# Patient Record
Sex: MALE | Race: WHITE
[De-identification: names, ages, dates, MRNs, and addresses within clinical notes are randomized per-mention and may not be internally consistent; named-entity substitution may affect disease eponyms.]

---

## 2017-05-19 ENCOUNTER — HOSPITAL ENCOUNTER (OUTPATIENT)
Dept: HOSPITAL 2 - ER | Age: 57
Setting detail: OBSERVATION
LOS: 1 days | Discharge: HOME | End: 2017-05-20
Attending: SURGERY | Admitting: SURGERY
Payer: COMMERCIAL

## 2017-05-19 VITALS — HEIGHT: 76 IN | WEIGHT: 246.92 LBS | BODY MASS INDEX: 30.07 KG/M2

## 2017-05-19 DIAGNOSIS — Z88.2: ICD-10-CM

## 2017-05-19 DIAGNOSIS — R10.31: ICD-10-CM

## 2017-05-19 DIAGNOSIS — Z88.0: ICD-10-CM

## 2017-05-19 DIAGNOSIS — R68.83: ICD-10-CM

## 2017-05-19 DIAGNOSIS — R10.33: ICD-10-CM

## 2017-05-19 DIAGNOSIS — Z79.899: ICD-10-CM

## 2017-05-19 DIAGNOSIS — Z79.891: ICD-10-CM

## 2017-05-19 DIAGNOSIS — K35.80: Primary | ICD-10-CM

## 2017-05-19 DIAGNOSIS — I10: ICD-10-CM

## 2017-05-19 LAB
ALBUMIN: 4.3 GM/DL (ref 3.4–5)
ALKALINE PHOSPHATASE: 37 U/L (ref 50–136)
ALT/SGPT: 15 U/L (ref 7.53–40.17)
AST/SGOT: 17 U/L (ref 6.66–35.34)
BASO #: 0 10_X3_UL (ref 0–0.1)
BASO %: 0.2 % (ref 0.2–1.2)
BILIRUBIN,TOTAL: 1.35 MG/DL (ref 0–1)
BLOOD UREA NITROGEN: 13 MG/DL (ref 7–18)
CALCIUM: 9 MG/DL (ref 8.7–10.7)
CARBON DIOXIDE: 26 MMOL/L (ref 21–32)
CKMB: 1.9 NG/ML (ref 0–5)
CREATININE: 1 MG/DL (ref 0.6–1.3)
EOS #: 0.1 10_X3_UL (ref 0–0.5)
EOS %: 1.2 % (ref 0.8–7)
GLUCOSE,RANDOM: 97 MG/DL (ref 70–99)
GRAN #: 4.9 10_X3_UL (ref 1.8–5.4)
GRAN %: 59.5 % (ref 34–67.9)
HEMATOCRIT: 44.3 % (ref 40–51)
HEMOGLOBIN: 15 G/DL (ref 13.7–17.5)
LYMPH #: 2.5 10_X3_UL (ref 1.3–3.6)
LYMPH %: 29.7 % (ref 21.8–53.1)
MEAN CORPUSCULAR HEMOGLOBIN: 30.1 PG (ref 27–33)
MEAN CORPUSCULAR HGB CONC: 33.9 G/DL (ref 32–36)
MEAN CORPUSCULAR VOLUME: 89 FL (ref 79–92)
MEAN PLATELET VOLUME: 10.7 FL (ref 7.5–11.5)
MONO #: 0.8 10_X3_UL (ref 0.3–0.8)
MONO %: 9.4 % (ref 5.3–12.2)
PH,URINE: 5 (ref 5–9)
PLATELET COUNT: 207 X10_3/UL (ref 163–337)
POTASSIUM: 4.3 MMOL/L (ref 3.5–5.1)
RED BLOOD COUNT: 4.98 X10_6/UL (ref 4.6–6.1)
RED CELL DISTRIBUTION WIDTH: 14.9 % (ref 11.6–14.4)
SODIUM: 139 MMOL/L (ref 136–145)
SPECIFIC GRAVITY,URINE: 1.02 (ref 1–1.03)
TOTAL PROTEIN: 7.3 GM/DL (ref 6.4–8.2)
TROP-I: < 0.3 NG/ML (ref 0–0.3)
URINE BILIRUBIN: NEGATIVE
URINE BLOOD: NEGATIVE
URINE GLUCOSE (UA): NORMAL MG/DL
URINE KETONE: NEGATIVE
URINE LEUKOCYTE ESTERASE: NEGATIVE
URINE NITRATE: NEGATIVE
URINE PROTEIN: NEGATIVE
UROBILINOGEN: NORMAL MG/DL (ref ?–1)
WHITE BLOOD COUNT: 8.3 X10_3/UL (ref 4.2–9.1)

## 2017-05-19 PROCEDURE — G0378 HOSPITAL OBSERVATION PER HR: HCPCS

## 2017-05-19 PROCEDURE — 0DTJ4ZZ RESECTION OF APPENDIX, PERCUTANEOUS ENDOSCOPIC APPROACH: ICD-10-PCS | Performed by: SURGERY

## 2017-05-20 LAB
CKMB: 1.8 NG/ML (ref 0–5)
CKMB: 1.9 NG/ML (ref 0–5)
HEMATOCRIT: 38.8 % (ref 40–51)
HEMOGLOBIN: 13.2 G/DL (ref 13.7–17.5)
MEAN CORPUSCULAR HEMOGLOBIN: 30.4 PG (ref 27–33)
MEAN CORPUSCULAR HGB CONC: 34 G/DL (ref 32–36)
MEAN CORPUSCULAR VOLUME: 89.4 FL (ref 79–92)
MEAN PLATELET VOLUME: 11.3 FL (ref 7.5–11.5)
PLATELET COUNT: 139 X10_3/UL (ref 163–337)
RED BLOOD COUNT: 4.34 X10_6/UL (ref 4.6–6.1)
RED CELL DISTRIBUTION WIDTH: 14.9 % (ref 11.6–14.4)
TROP-I: < 0.3 NG/ML (ref 0–0.3)
TROP-I: < 0.3 NG/ML (ref 0–0.3)
WHITE BLOOD COUNT: 11.8 X10_3/UL (ref 4.2–9.1)

## 2017-05-21 ENCOUNTER — HOSPITAL ENCOUNTER (EMERGENCY)
Dept: HOSPITAL 2 - ER | Age: 57
Discharge: HOME | End: 2017-05-21
Payer: COMMERCIAL

## 2017-05-21 DIAGNOSIS — R63.5: ICD-10-CM

## 2017-05-21 DIAGNOSIS — R14.3: ICD-10-CM

## 2017-05-21 DIAGNOSIS — Z98.890: ICD-10-CM

## 2017-05-21 DIAGNOSIS — Z88.2: ICD-10-CM

## 2017-05-21 DIAGNOSIS — Z88.0: ICD-10-CM

## 2017-05-21 DIAGNOSIS — R06.01: Primary | ICD-10-CM

## 2017-05-21 LAB
ALBUMIN: 3.8 GM/DL (ref 3.4–5)
ALKALINE PHOSPHATASE: 31 U/L (ref 50–136)
ALT/SGPT: 16 U/L (ref 7.53–40.17)
AST/SGOT: 21 U/L (ref 6.66–35.34)
BILIRUBIN,TOTAL: 0.58 MG/DL (ref 0–1)
BLOOD UREA NITROGEN: 21 MG/DL (ref 7–18)
CALCIUM: 8.2 MG/DL (ref 8.7–10.7)
CARBON DIOXIDE: 23 MMOL/L (ref 21–32)
CREATININE: 1.2 MG/DL (ref 0.6–1.3)
GLUCOSE,RANDOM: 101 MG/DL (ref 70–99)
HEMATOCRIT: 40.1 % (ref 40–51)
HEMOGLOBIN: 13.6 G/DL (ref 13.7–17.5)
MEAN CORPUSCULAR HEMOGLOBIN: 30.4 PG (ref 27–33)
MEAN CORPUSCULAR HGB CONC: 33.9 G/DL (ref 32–36)
MEAN CORPUSCULAR VOLUME: 89.5 FL (ref 79–92)
MEAN PLATELET VOLUME: 11.3 FL (ref 7.5–11.5)
PLATELET COUNT: 185 X10_3/UL (ref 163–337)
POTASSIUM: 4.3 MMOL/L (ref 3.5–5.1)
RED BLOOD COUNT: 4.48 X10_6/UL (ref 4.6–6.1)
RED CELL DISTRIBUTION WIDTH: 15.1 % (ref 11.6–14.4)
SODIUM: 138 MMOL/L (ref 136–145)
TOTAL PROTEIN: 6.5 GM/DL (ref 6.4–8.2)
WHITE BLOOD COUNT: 11.6 X10_3/UL (ref 4.2–9.1)

## 2017-05-27 ENCOUNTER — HOSPITAL ENCOUNTER (EMERGENCY)
Dept: HOSPITAL 2 - ER | Age: 57
Discharge: OTHER INSTITUTION | End: 2017-05-27
Payer: COMMERCIAL

## 2017-05-27 ENCOUNTER — HOSPITAL ENCOUNTER (INPATIENT)
Dept: HOSPITAL 2 - ER | Age: 57
LOS: 5 days | Discharge: HOME | DRG: 392 | End: 2017-06-01
Attending: FAMILY MEDICINE | Admitting: FAMILY MEDICINE
Payer: COMMERCIAL

## 2017-05-27 VITALS — WEIGHT: 249.12 LBS | HEIGHT: 76 IN | BODY MASS INDEX: 30.34 KG/M2

## 2017-05-27 DIAGNOSIS — Z79.82: ICD-10-CM

## 2017-05-27 DIAGNOSIS — G47.33: ICD-10-CM

## 2017-05-27 DIAGNOSIS — Z79.891: ICD-10-CM

## 2017-05-27 DIAGNOSIS — R10.30: ICD-10-CM

## 2017-05-27 DIAGNOSIS — Z83.3: ICD-10-CM

## 2017-05-27 DIAGNOSIS — K57.32: Primary | ICD-10-CM

## 2017-05-27 DIAGNOSIS — J06.9: ICD-10-CM

## 2017-05-27 DIAGNOSIS — Z88.0: ICD-10-CM

## 2017-05-27 DIAGNOSIS — Z88.2: ICD-10-CM

## 2017-05-27 DIAGNOSIS — F43.10: ICD-10-CM

## 2017-05-27 DIAGNOSIS — Z79.899: ICD-10-CM

## 2017-05-27 DIAGNOSIS — R11.0: ICD-10-CM

## 2017-05-27 DIAGNOSIS — M54.9: ICD-10-CM

## 2017-05-27 DIAGNOSIS — I10: ICD-10-CM

## 2017-05-27 DIAGNOSIS — K59.00: ICD-10-CM

## 2017-05-27 DIAGNOSIS — G89.29: ICD-10-CM

## 2017-05-27 DIAGNOSIS — E66.9: ICD-10-CM

## 2017-05-27 DIAGNOSIS — H40.9: ICD-10-CM

## 2017-05-27 DIAGNOSIS — D72.829: ICD-10-CM

## 2017-05-27 DIAGNOSIS — J40: ICD-10-CM

## 2017-05-27 DIAGNOSIS — F41.9: ICD-10-CM

## 2017-05-27 LAB
ALBUMIN: 4 GM/DL (ref 3.4–5)
ALKALINE PHOSPHATASE: 35 U/L (ref 50–136)
ALT/SGPT: 23 U/L (ref 7.53–40.17)
AST/SGOT: 22 U/L (ref 6.66–35.34)
BASO #: 0 10_X3_UL (ref 0–0.1)
BASO %: 0.1 % (ref 0.2–1.2)
BILIRUBIN,TOTAL: 1.34 MG/DL (ref 0–1)
BLOOD UREA NITROGEN: 15 MG/DL (ref 7–18)
CALCIUM: 9.2 MG/DL (ref 8.7–10.7)
CARBON DIOXIDE: 23 MMOL/L (ref 21–32)
CREATININE: 0.9 MG/DL (ref 0.6–1.3)
EOS #: 0.2 10_X3_UL (ref 0–0.5)
EOS %: 1.6 % (ref 0.8–7)
GLUCOSE,RANDOM: 97 MG/DL (ref 70–99)
GRAN #: 10.2 10_X3_UL (ref 1.8–5.4)
GRAN %: 70.5 % (ref 34–67.9)
HEMATOCRIT: 45.4 % (ref 40–51)
HEMOGLOBIN: 15.5 G/DL (ref 13.7–17.5)
LYMPH #: 2.7 10_X3_UL (ref 1.3–3.6)
LYMPH %: 18.3 % (ref 21.8–53.1)
MAGNESIUM: 2 MG/DL (ref 1.8–2.4)
MEAN CORPUSCULAR HEMOGLOBIN: 30.4 PG (ref 27–33)
MEAN CORPUSCULAR HGB CONC: 34.1 G/DL (ref 32–36)
MEAN CORPUSCULAR VOLUME: 89 FL (ref 79–92)
MEAN PLATELET VOLUME: 10.8 FL (ref 7.5–11.5)
MONO #: 1.4 10_X3_UL (ref 0.3–0.8)
MONO %: 9.5 % (ref 5.3–12.2)
PH,URINE: 6.5 (ref 5–9)
PLATELET COUNT: 250 X10_3/UL (ref 163–337)
POTASSIUM: 4.2 MMOL/L (ref 3.5–5.1)
RED BLOOD COUNT: 5.1 X10_6/UL (ref 4.6–6.1)
RED CELL DISTRIBUTION WIDTH: 14.7 % (ref 11.6–14.4)
SODIUM: 136 MMOL/L (ref 136–145)
SPECIFIC GRAVITY,URINE: 1.01 (ref 1–1.03)
TOTAL PROTEIN: 7.1 GM/DL (ref 6.4–8.2)
URINE BILIRUBIN: NEGATIVE
URINE BLOOD: NEGATIVE
URINE GLUCOSE (UA): NORMAL MG/DL
URINE KETONE: NEGATIVE
URINE LEUKOCYTE ESTERASE: (no result)
URINE NITRATE: NEGATIVE
URINE PROTEIN: NEGATIVE
URINE WBC: (no result) /[HPF] (ref 0–3)
UROBILINOGEN: NORMAL MG/DL (ref ?–1)
WHITE BLOOD COUNT: 14.5 X10_3/UL (ref 4.2–9.1)

## 2017-05-28 LAB
BLOOD UREA NITROGEN: 15 MG/DL (ref 7–18)
CALCIUM: 9.1 MG/DL (ref 8.7–10.7)
CARBON DIOXIDE: 23 MMOL/L (ref 21–32)
CREATININE: 0.9 MG/DL (ref 0.6–1.3)
GLUCOSE,RANDOM: 103 MG/DL (ref 70–99)
HEMATOCRIT: 45 % (ref 40–51)
HEMOGLOBIN: 15.3 G/DL (ref 13.7–17.5)
MEAN CORPUSCULAR HEMOGLOBIN: 30.1 PG (ref 27–33)
MEAN CORPUSCULAR HGB CONC: 34 G/DL (ref 32–36)
MEAN CORPUSCULAR VOLUME: 88.4 FL (ref 79–92)
MEAN PLATELET VOLUME: 10.7 FL (ref 7.5–11.5)
PLATELET COUNT: 244 X10_3/UL (ref 163–337)
POTASSIUM: 4.1 MMOL/L (ref 3.5–5.1)
RED BLOOD COUNT: 5.09 X10_6/UL (ref 4.6–6.1)
RED CELL DISTRIBUTION WIDTH: 14.7 % (ref 11.6–14.4)
SODIUM: 136 MMOL/L (ref 136–145)
WHITE BLOOD COUNT: 11.9 X10_3/UL (ref 4.2–9.1)

## 2017-05-29 LAB
ALBUMIN: 4 GM/DL (ref 3.4–5)
ALKALINE PHOSPHATASE: 37 U/L (ref 50–136)
ALT/SGPT: 17 U/L (ref 7.53–40.17)
AST/SGOT: 15 U/L (ref 6.66–35.34)
BILIRUBIN,TOTAL: 2.1 MG/DL (ref 0–1)
BLOOD UREA NITROGEN: 15 MG/DL (ref 7–18)
CALCIUM: 9.1 MG/DL (ref 8.7–10.7)
CARBON DIOXIDE: 22 MMOL/L (ref 21–32)
CREATININE: 0.9 MG/DL (ref 0.6–1.3)
GLUCOSE,RANDOM: 98 MG/DL (ref 70–99)
HEMATOCRIT: 43.3 % (ref 40–51)
HEMOGLOBIN: 14.8 G/DL (ref 13.7–17.5)
MEAN CORPUSCULAR HEMOGLOBIN: 30.5 PG (ref 27–33)
MEAN CORPUSCULAR HGB CONC: 34.2 G/DL (ref 32–36)
MEAN CORPUSCULAR VOLUME: 89.1 FL (ref 79–92)
MEAN PLATELET VOLUME: 11.1 FL (ref 7.5–11.5)
PLATELET COUNT: 220 X10_3/UL (ref 163–337)
POTASSIUM: 4.2 MMOL/L (ref 3.5–5.1)
RED BLOOD COUNT: 4.86 X10_6/UL (ref 4.6–6.1)
RED CELL DISTRIBUTION WIDTH: 14.5 % (ref 11.6–14.4)
SODIUM: 137 MMOL/L (ref 136–145)
TOTAL PROTEIN: 6.9 GM/DL (ref 6.4–8.2)
WHITE BLOOD COUNT: 9.4 X10_3/UL (ref 4.2–9.1)

## 2017-05-30 LAB
ALBUMIN: 4.1 GM/DL (ref 3.4–5)
ALKALINE PHOSPHATASE: 33 U/L (ref 50–136)
ALT/SGPT: 16 U/L (ref 7.53–40.17)
AST/SGOT: 18 U/L (ref 6.66–35.34)
BASO #: 0 10_X3_UL (ref 0–0.1)
BASO %: 0.1 % (ref 0.2–1.2)
BILIRUBIN,TOTAL: 1.76 MG/DL (ref 0–1)
BLOOD UREA NITROGEN: 13 MG/DL (ref 7–18)
CALCIUM: 9.6 MG/DL (ref 8.7–10.7)
CARBON DIOXIDE: 22 MMOL/L (ref 21–32)
CREATININE: 1 MG/DL (ref 0.6–1.3)
EOS #: 0.3 10_X3_UL (ref 0–0.5)
EOS %: 1.9 % (ref 0.8–7)
GLUCOSE,RANDOM: 105 MG/DL (ref 70–99)
GRAN #: 9.9 10_X3_UL (ref 1.8–5.4)
GRAN %: 72.7 % (ref 34–67.9)
HEMATOCRIT: 43.5 % (ref 40–51)
HEMOGLOBIN: 15 G/DL (ref 13.7–17.5)
LYMPH #: 2 10_X3_UL (ref 1.3–3.6)
LYMPH %: 14.7 % (ref 21.8–53.1)
MEAN CORPUSCULAR HEMOGLOBIN: 30.4 PG (ref 27–33)
MEAN CORPUSCULAR HGB CONC: 34.5 G/DL (ref 32–36)
MEAN CORPUSCULAR VOLUME: 88.2 FL (ref 79–92)
MEAN PLATELET VOLUME: 10.7 FL (ref 7.5–11.5)
MONO #: 1.4 10_X3_UL (ref 0.3–0.8)
MONO %: 10.6 % (ref 5.3–12.2)
PLATELET COUNT: 231 X10_3/UL (ref 163–337)
POTASSIUM: 4.2 MMOL/L (ref 3.5–5.1)
RED BLOOD COUNT: 4.93 X10_6/UL (ref 4.6–6.1)
RED CELL DISTRIBUTION WIDTH: 14.2 % (ref 11.6–14.4)
SODIUM: 136 MMOL/L (ref 136–145)
TOTAL PROTEIN: 7.4 GM/DL (ref 6.4–8.2)
WHITE BLOOD COUNT: 13.6 X10_3/UL (ref 4.2–9.1)

## 2017-05-31 LAB
BLOOD UREA NITROGEN: 16 MG/DL (ref 7–18)
CALCIUM: 9.3 MG/DL (ref 8.7–10.7)
CARBON DIOXIDE: 23 MMOL/L (ref 21–32)
CREATININE: 1.2 MG/DL (ref 0.6–1.3)
GLUCOSE,RANDOM: 114 MG/DL (ref 70–99)
HEMATOCRIT: 43.6 % (ref 40–51)
HEMOGLOBIN: 15 G/DL (ref 13.7–17.5)
MEAN CORPUSCULAR HEMOGLOBIN: 30.3 PG (ref 27–33)
MEAN CORPUSCULAR HGB CONC: 34.4 G/DL (ref 32–36)
MEAN CORPUSCULAR VOLUME: 88.1 FL (ref 79–92)
MEAN PLATELET VOLUME: 10.7 FL (ref 7.5–11.5)
PLATELET COUNT: 236 X10_3/UL (ref 163–337)
POTASSIUM: 4.3 MMOL/L (ref 3.5–5.1)
RED BLOOD COUNT: 4.95 X10_6/UL (ref 4.6–6.1)
RED CELL DISTRIBUTION WIDTH: 14.2 % (ref 11.6–14.4)
SODIUM: 135 MMOL/L (ref 136–145)
WHITE BLOOD COUNT: 13.3 X10_3/UL (ref 4.2–9.1)

## 2017-06-01 LAB
BLOOD UREA NITROGEN: 16 MG/DL (ref 7–18)
CALCIUM: 9.3 MG/DL (ref 8.7–10.7)
CARBON DIOXIDE: 22 MMOL/L (ref 21–32)
CREATININE: 1.1 MG/DL (ref 0.6–1.3)
GLUCOSE,RANDOM: 106 MG/DL (ref 70–99)
HEMATOCRIT: 42.8 % (ref 40–51)
HEMOGLOBIN: 14.8 G/DL (ref 13.7–17.5)
MEAN CORPUSCULAR HEMOGLOBIN: 30.4 PG (ref 27–33)
MEAN CORPUSCULAR HGB CONC: 34.6 G/DL (ref 32–36)
MEAN CORPUSCULAR VOLUME: 87.9 FL (ref 79–92)
MEAN PLATELET VOLUME: 10.9 FL (ref 7.5–11.5)
PLATELET COUNT: 238 X10_3/UL (ref 163–337)
POTASSIUM: 4.1 MMOL/L (ref 3.5–5.1)
RED BLOOD COUNT: 4.87 X10_6/UL (ref 4.6–6.1)
RED CELL DISTRIBUTION WIDTH: 14.2 % (ref 11.6–14.4)
SODIUM: 133 MMOL/L (ref 136–145)
WHITE BLOOD COUNT: 9.8 X10_3/UL (ref 4.2–9.1)

## 2019-03-21 ENCOUNTER — HOSPITAL ENCOUNTER (EMERGENCY)
Facility: HOSPITAL | Age: 59
Discharge: HOME | End: 2019-03-21
Attending: EMERGENCY MEDICINE | Admitting: EMERGENCY MEDICINE

## 2019-03-21 ENCOUNTER — APPOINTMENT (OUTPATIENT)
Dept: GENERAL RADIOLOGY | Facility: HOSPITAL | Age: 59
End: 2019-03-21

## 2019-03-21 ENCOUNTER — APPOINTMENT (OUTPATIENT)
Dept: CT IMAGING | Facility: HOSPITAL | Age: 59
End: 2019-03-21

## 2019-03-21 ENCOUNTER — APPOINTMENT (OUTPATIENT)
Dept: CARDIOLOGY | Facility: HOSPITAL | Age: 59
End: 2019-03-21

## 2019-03-21 VITALS
BODY MASS INDEX: 32.33 KG/M2 | HEART RATE: 79 BPM | HEIGHT: 75 IN | OXYGEN SATURATION: 93 % | WEIGHT: 260 LBS | SYSTOLIC BLOOD PRESSURE: 123 MMHG | DIASTOLIC BLOOD PRESSURE: 70 MMHG | TEMPERATURE: 100.2 F | RESPIRATION RATE: 18 BRPM

## 2019-03-21 DIAGNOSIS — Z86.59 HISTORY OF POSTTRAUMATIC STRESS DISORDER (PTSD): ICD-10-CM

## 2019-03-21 DIAGNOSIS — Z86.59 HISTORY OF DEPRESSION: ICD-10-CM

## 2019-03-21 DIAGNOSIS — M54.2 ACUTE NECK PAIN: ICD-10-CM

## 2019-03-21 DIAGNOSIS — B34.9 ACUTE VIRAL SYNDROME: Primary | ICD-10-CM

## 2019-03-21 DIAGNOSIS — Z86.79 HISTORY OF HYPERTENSION: ICD-10-CM

## 2019-03-21 DIAGNOSIS — R50.9 FEVER AND CHILLS: ICD-10-CM

## 2019-03-21 LAB
ALBUMIN SERPL-MCNC: 4.79 G/DL (ref 3.2–4.8)
ALBUMIN/GLOB SERPL: 1.8 G/DL (ref 1.5–2.5)
ALP SERPL-CCNC: 31 U/L (ref 25–100)
ALT SERPL W P-5'-P-CCNC: 26 U/L (ref 7–40)
ANION GAP SERPL CALCULATED.3IONS-SCNC: 11 MMOL/L (ref 3–11)
APPEARANCE CSF: CLEAR
AST SERPL-CCNC: 26 U/L (ref 0–33)
BASOPHILS # BLD AUTO: 0.01 10*3/MM3 (ref 0–0.2)
BASOPHILS NFR BLD AUTO: 0.1 % (ref 0–1)
BILIRUB SERPL-MCNC: 1.7 MG/DL (ref 0.3–1.2)
BILIRUB UR QL STRIP: NEGATIVE
BUN BLD-MCNC: 22 MG/DL (ref 9–23)
BUN/CREAT SERPL: 19.5 (ref 7–25)
CALCIUM SPEC-SCNC: 9.3 MG/DL (ref 8.7–10.4)
CHLORIDE SERPL-SCNC: 105 MMOL/L (ref 99–109)
CLARITY UR: CLEAR
CO2 SERPL-SCNC: 22 MMOL/L (ref 20–31)
COLOR CSF: COLORLESS
COLOR SPUN CSF: COLORLESS
COLOR UR: YELLOW
CREAT BLD-MCNC: 1.13 MG/DL (ref 0.6–1.3)
D-LACTATE SERPL-SCNC: 2 MMOL/L (ref 0.5–2)
DEPRECATED RDW RBC AUTO: 45.8 FL (ref 37–54)
EOSINOPHIL # BLD AUTO: 0.02 10*3/MM3 (ref 0–0.3)
EOSINOPHIL NFR BLD AUTO: 0.2 % (ref 0–3)
ERYTHROCYTE [DISTWIDTH] IN BLOOD BY AUTOMATED COUNT: 13.8 % (ref 11.3–14.5)
FLUAV SUBTYP SPEC NAA+PROBE: NOT DETECTED
FLUBV RNA ISLT QL NAA+PROBE: NOT DETECTED
GFR SERPL CREATININE-BSD FRML MDRD: 67 ML/MIN/1.73
GLOBULIN UR ELPH-MCNC: 2.6 GM/DL
GLUCOSE BLD-MCNC: 112 MG/DL (ref 70–100)
GLUCOSE CSF-MCNC: 70 MG/DL (ref 40–70)
GLUCOSE UR STRIP-MCNC: NEGATIVE MG/DL
HCT VFR BLD AUTO: 48 % (ref 38.9–50.9)
HGB BLD-MCNC: 15.9 G/DL (ref 13.1–17.5)
HGB UR QL STRIP.AUTO: NEGATIVE
IMM GRANULOCYTES # BLD AUTO: 0.03 10*3/MM3 (ref 0–0.05)
IMM GRANULOCYTES NFR BLD AUTO: 0.2 % (ref 0–0.6)
KETONES UR QL STRIP: NEGATIVE
LEUKOCYTE ESTERASE UR QL STRIP.AUTO: NEGATIVE
LYMPHOCYTES # BLD AUTO: 0.47 10*3/MM3 (ref 0.6–4.8)
LYMPHOCYTES NFR BLD AUTO: 3.9 % (ref 24–44)
MCH RBC QN AUTO: 30.2 PG (ref 27–31)
MCHC RBC AUTO-ENTMCNC: 33.1 G/DL (ref 32–36)
MCV RBC AUTO: 91.3 FL (ref 80–99)
MONOCYTES # BLD AUTO: 0.66 10*3/MM3 (ref 0–1)
MONOCYTES NFR BLD AUTO: 5.4 % (ref 0–12)
NEUTROPHILS # BLD AUTO: 11.03 10*3/MM3 (ref 1.5–8.3)
NEUTROPHILS NFR BLD AUTO: 90.4 % (ref 41–71)
NITRITE UR QL STRIP: NEGATIVE
PH UR STRIP.AUTO: <=5 [PH] (ref 5–8)
PLATELET # BLD AUTO: 206 10*3/MM3 (ref 150–450)
PMV BLD AUTO: 11.1 FL (ref 6–12)
POTASSIUM BLD-SCNC: 4 MMOL/L (ref 3.5–5.5)
PROCALCITONIN SERPL-MCNC: 0.24 NG/ML
PROT CSF-MCNC: 46 MG/DL (ref 15–45)
PROT SERPL-MCNC: 7.4 G/DL (ref 5.7–8.2)
PROT UR QL STRIP: NEGATIVE
RBC # BLD AUTO: 5.26 10*6/MM3 (ref 4.2–5.76)
RBC # CSF MANUAL: 3 /MM3 (ref 0–5)
SODIUM BLD-SCNC: 138 MMOL/L (ref 132–146)
SP GR UR STRIP: 1.02 (ref 1–1.03)
SPECIMEN VOL CSF: 11.3 ML
TUBE # CSF: 4
UROBILINOGEN UR QL STRIP: NORMAL
WBC # CSF MANUAL: 1 /MM3 (ref 0–5)
WBC NRBC COR # BLD: 12.19 10*3/MM3 (ref 3.5–10.8)

## 2019-03-21 PROCEDURE — 85025 COMPLETE CBC W/AUTO DIFF WBC: CPT | Performed by: PHYSICIAN ASSISTANT

## 2019-03-21 PROCEDURE — 87205 SMEAR GRAM STAIN: CPT | Performed by: EMERGENCY MEDICINE

## 2019-03-21 PROCEDURE — 82945 GLUCOSE OTHER FLUID: CPT | Performed by: EMERGENCY MEDICINE

## 2019-03-21 PROCEDURE — 83605 ASSAY OF LACTIC ACID: CPT | Performed by: PHYSICIAN ASSISTANT

## 2019-03-21 PROCEDURE — 93971 EXTREMITY STUDY: CPT

## 2019-03-21 PROCEDURE — 84157 ASSAY OF PROTEIN OTHER: CPT | Performed by: EMERGENCY MEDICINE

## 2019-03-21 PROCEDURE — 87015 SPECIMEN INFECT AGNT CONCNTJ: CPT | Performed by: EMERGENCY MEDICINE

## 2019-03-21 PROCEDURE — 70450 CT HEAD/BRAIN W/O DYE: CPT

## 2019-03-21 PROCEDURE — 77003 FLUOROGUIDE FOR SPINE INJECT: CPT

## 2019-03-21 PROCEDURE — 87040 BLOOD CULTURE FOR BACTERIA: CPT | Performed by: PHYSICIAN ASSISTANT

## 2019-03-21 PROCEDURE — 81003 URINALYSIS AUTO W/O SCOPE: CPT | Performed by: PHYSICIAN ASSISTANT

## 2019-03-21 PROCEDURE — 71045 X-RAY EXAM CHEST 1 VIEW: CPT

## 2019-03-21 PROCEDURE — 80053 COMPREHEN METABOLIC PANEL: CPT | Performed by: PHYSICIAN ASSISTANT

## 2019-03-21 PROCEDURE — 99284 EMERGENCY DEPT VISIT MOD MDM: CPT

## 2019-03-21 PROCEDURE — 89050 BODY FLUID CELL COUNT: CPT | Performed by: EMERGENCY MEDICINE

## 2019-03-21 PROCEDURE — 87070 CULTURE OTHR SPECIMN AEROBIC: CPT | Performed by: EMERGENCY MEDICINE

## 2019-03-21 PROCEDURE — 96360 HYDRATION IV INFUSION INIT: CPT

## 2019-03-21 PROCEDURE — 87502 INFLUENZA DNA AMP PROBE: CPT | Performed by: EMERGENCY MEDICINE

## 2019-03-21 PROCEDURE — 72125 CT NECK SPINE W/O DYE: CPT

## 2019-03-21 PROCEDURE — 84145 PROCALCITONIN (PCT): CPT | Performed by: PHYSICIAN ASSISTANT

## 2019-03-21 RX ORDER — CLONAZEPAM 1 MG/1
1 TABLET ORAL 2 TIMES DAILY PRN
COMMUNITY

## 2019-03-21 RX ORDER — IBUPROFEN 800 MG/1
800 TABLET ORAL ONCE
Status: COMPLETED | OUTPATIENT
Start: 2019-03-21 | End: 2019-03-21

## 2019-03-21 RX ORDER — ACETAMINOPHEN 500 MG
1000 TABLET ORAL ONCE
Status: COMPLETED | OUTPATIENT
Start: 2019-03-21 | End: 2019-03-21

## 2019-03-21 RX ORDER — ENALAPRIL MALEATE 10 MG/1
10 TABLET ORAL DAILY
COMMUNITY

## 2019-03-21 RX ORDER — LIDOCAINE HYDROCHLORIDE 10 MG/ML
10 INJECTION, SOLUTION INFILTRATION; PERINEURAL ONCE
Status: COMPLETED | OUTPATIENT
Start: 2019-03-21 | End: 2019-03-21

## 2019-03-21 RX ORDER — OXYCODONE AND ACETAMINOPHEN 10; 325 MG/1; MG/1
1 TABLET ORAL EVERY 6 HOURS PRN
COMMUNITY

## 2019-03-21 RX ORDER — ASPIRIN 81 MG/1
81 TABLET, CHEWABLE ORAL DAILY
COMMUNITY

## 2019-03-21 RX ADMIN — ACETAMINOPHEN 1000 MG: 500 TABLET, FILM COATED ORAL at 14:08

## 2019-03-21 RX ADMIN — IBUPROFEN 800 MG: 800 TABLET ORAL at 19:49

## 2019-03-21 RX ADMIN — LIDOCAINE HYDROCHLORIDE 10 ML: 10 INJECTION, SOLUTION INFILTRATION; PERINEURAL at 18:02

## 2019-03-21 RX ADMIN — SODIUM CHLORIDE 1000 ML: 9 INJECTION, SOLUTION INTRAVENOUS at 15:15

## 2019-03-21 NOTE — ED PROVIDER NOTES
Subjective   Sam Elliott is a 58 y.o.male who presents to the ED with his wife by referral from his PCP with right upper extremity pain and neck pain. He states that he developed neck pain one month ago.He denies any known injury or trauma.  He is a retired .  He had a previous work-related injury, but he never had neck pain with this injury.  He also now is experiencing right forearm pain and right bicep pain that worsens with movement, flexion, extension and rotation. He states that this pain has been persistent and has called his PCP about it but has not been seen until this morning. This morning, he woke up to neck pain and felt chills so he took his temperature noting that he had a fever at 102. His neck pain worsens with movement and twisting and he states that his neck is stiff. He experiences a generalized headache but denies any numbness, tingling or weakness. This morning, he woke up to use the commode then upon ambulation he felt dizzy and light-headed and was near-syncopal. He denies any cough, rhinorrhea, sore throat, or sinus pain. He denies any nausea, vomiting, abdominal pain, diarrhea, constipation, dysuria, hematuria or frequency. He denies any back pain, lower extremity pain, left upper extremity pain, chest pain or flank pain. He saw his PCP this morning in [place] and then was sent to [facility] ED to rule out a DVT and meningitis. Patient says 1 1/2 months ago, he was diagnosed with pneumonia and H1N1 flu.  The current symptoms feel different.  He states that he had swine flu and PNA a month and a half ago that he recovered from. He reports a h/o HLD but denies CAD or stent placement. He reports a h/o ALEXI on CPAP but denies DDD, neck surgeries or back surgeries.          History provided by:  Patient and spouse  Illness   Location:  Right forearm and upper arm pain, neck pain and stiffness  Quality:  Pain and stiffness   Severity:  Moderate  Onset quality:  Gradual  Duration:  1  month  Timing:  Constant  Progression:  Worsening  Chronicity:  Recurrent  Context:  Has experienced right forearm pain and upper bicep pain that radiates into his neck for one month. he developed a fever this morning, saw PCP this morning told to go to ED for meningitis or DVT rule out  Relieved by:  Nothing  Worsened by:  Movement  Ineffective treatments:  Rest   Associated symptoms: fever, headaches and myalgias    Associated symptoms: no abdominal pain, no chest pain, no cough, no diarrhea, no nausea, no rash, no rhinorrhea, no shortness of breath, no sore throat and no vomiting    Risk factors:  HLD      Review of Systems   Constitutional: Positive for activity change, appetite change, chills and fever.   HENT: Negative for rhinorrhea and sore throat.    Respiratory: Negative for cough and shortness of breath.    Cardiovascular: Negative for chest pain and leg swelling.   Gastrointestinal: Negative for abdominal pain, blood in stool, constipation, diarrhea, nausea and vomiting.   Genitourinary: Negative for decreased urine volume, dysuria, flank pain, frequency, hematuria and urgency.   Musculoskeletal: Positive for myalgias, neck pain and neck stiffness. Negative for back pain.   Skin: Negative for rash.   Neurological: Positive for dizziness, light-headedness and headaches. Negative for syncope, weakness and numbness.   All other systems reviewed and are negative.      Past Medical History:   Diagnosis Date   • Depression    • Injury of back    • PTSD (post-traumatic stress disorder)        Allergies   Allergen Reactions   • Penicillins Hives       Past Surgical History:   Procedure Laterality Date   • APPENDECTOMY     • HERNIA REPAIR     • KNEE SURGERY         History reviewed. No pertinent family history.    Social History     Socioeconomic History   • Marital status:      Spouse name: Not on file   • Number of children: Not on file   • Years of education: Not on file   • Highest education level: Not  on file   Tobacco Use   • Smoking status: Never Smoker   Substance and Sexual Activity   • Alcohol use: Yes     Comment: RARELY   • Drug use: No         Objective   Physical Exam   Constitutional: He is oriented to person, place, and time. He appears well-developed and well-nourished. No distress.   HENT:   Head: Normocephalic and atraumatic.   Right Ear: External ear normal.   Left Ear: External ear normal.   Nose: Nose normal.   Mouth/Throat: Oropharynx is clear and moist.   Face is flushed skin warm to touch s/t fever  Bilateral TM clear. No frontal or maxillary sinus tenderness.   Clear oral pharynx.    Eyes: Conjunctivae are normal. No scleral icterus.   Neck: Normal range of motion. Neck supple.   Point tenderness C spine and stiffness with pain with forward flexion symptoms concerning for meningitis.    Cardiovascular: Regular rhythm, normal heart sounds and intact distal pulses. Tachycardia present. Exam reveals no friction rub.   No murmur heard.  Tachycardiac.   Strong radial and ulnar pulses.    Pulmonary/Chest: Effort normal and breath sounds normal. No respiratory distress. He has no wheezes. He has no rales.   Abdominal: Soft. Bowel sounds are normal. He exhibits no distension. There is no tenderness.   Musculoskeletal: Normal range of motion. He exhibits tenderness. He exhibits no edema.   Right upper extremity is tender to palpation to the right bicep and forearm. No redness, warmth or swelling.   Neurological: He is alert and oriented to person, place, and time.   Strong equal 5/5  strength.   Skin: Skin is warm and dry. He is not diaphoretic.   Psychiatric: He has a normal mood and affect. His behavior is normal.   Nursing note and vitals reviewed.      Procedures         ED Course  ED Course as of Mar 21 2016   Thu Mar 21, 2019   1950 CBC revealed mild leukocytosis with white blood cell count of 12,000.  Differential showed 90% neutrophils.  Chemistries were essentially within normal limits  except for mild elevated bilirubin at 1.7.  Lactic acid is 2.0.  Pro-calcitonin is 0.24.  Influenza a and B by PCR were negative.  Blood cultures x2 sets are in process.  Urinalysis showed no evidence of acute infectious process.  Chest x-ray showed no active disease.  CT of the brain without contrast and CT of the cervical spine without contrast showed no acute abnormalities.  Ultrasound of the right upper extremity showed no evidence of DVT and this was relayed per the ultrasound technician.  Patient underwent lumbar puncture by interventional radiologist.  Patient tolerated the LP well without any complications.  Patient had a total of 11 cc of clear CSF.  Awaiting cultures.  Updated patient's spouse while he was having his lumbar puncture.  He returned and continues to have a low-grade fever at 100.2.  We gave Tylenol initially and now we will give ibuprofen.  [FC]   1953 Culture from the CSF fluid showed no white blood cells or other organisms.  Glucose in the CSF was 70 and protein in the CSF was 46.  I will go discussed all results with Dr. Workman.  [FC]   2008 Discussed all workup with Dr. Workman.  There is no evidence of meningitis.  Patient's history, exam, and workup are consistent with acute viral syndrome with fever and chills.  Recommend patient increase fluids.  Tylenol/ibuprofen every 4-6 hours as needed for fever.  Recommend very close PCP follow-up on Monday for recheck.  Return sooner if any worsening symptoms.  Updated patient and spouse on all results.  They verbalized understanding and were very appreciative.  Vital signs are stable and we will prepare patient for discharge to home.  [FC]      ED Course User Index  [FC] Jessica Galvan, CAT      Recent Results (from the past 24 hour(s))   Influenza A & B, RT PCR - Swab, Nasopharynx    Collection Time: 03/21/19  2:03 PM   Result Value Ref Range    Influenza A PCR Not Detected Not Detected    Influenza B PCR Not Detected Not Detected    Urinalysis With Microscopic If Indicated (No Culture) - Urine, Clean Catch    Collection Time: 03/21/19  2:04 PM   Result Value Ref Range    Color, UA Yellow Yellow, Straw    Appearance, UA Clear Clear    pH, UA <=5.0 5.0 - 8.0    Specific Gravity, UA 1.025 1.001 - 1.030    Glucose, UA Negative Negative    Ketones, UA Negative Negative    Bilirubin, UA Negative Negative    Blood, UA Negative Negative    Protein, UA Negative Negative    Leuk Esterase, UA Negative Negative    Nitrite, UA Negative Negative    Urobilinogen, UA 0.2 E.U./dL 0.2 - 1.0 E.U./dL   Comprehensive Metabolic Panel    Collection Time: 03/21/19  2:13 PM   Result Value Ref Range    Glucose 112 (H) 70 - 100 mg/dL    BUN 22 9 - 23 mg/dL    Creatinine 1.13 0.60 - 1.30 mg/dL    Sodium 138 132 - 146 mmol/L    Potassium 4.0 3.5 - 5.5 mmol/L    Chloride 105 99 - 109 mmol/L    CO2 22.0 20.0 - 31.0 mmol/L    Calcium 9.3 8.7 - 10.4 mg/dL    Total Protein 7.4 5.7 - 8.2 g/dL    Albumin 4.79 3.20 - 4.80 g/dL    ALT (SGPT) 26 7 - 40 U/L    AST (SGOT) 26 0 - 33 U/L    Alkaline Phosphatase 31 25 - 100 U/L    Total Bilirubin 1.7 (H) 0.3 - 1.2 mg/dL    eGFR Non African Amer 67 >60 mL/min/1.73    Globulin 2.6 gm/dL    A/G Ratio 1.8 1.5 - 2.5 g/dL    BUN/Creatinine Ratio 19.5 7.0 - 25.0    Anion Gap 11.0 3.0 - 11.0 mmol/L   Procalcitonin    Collection Time: 03/21/19  2:13 PM   Result Value Ref Range    Procalcitonin 0.24 <=0.25 ng/mL   Lactic Acid, Plasma    Collection Time: 03/21/19  2:13 PM   Result Value Ref Range    Lactate 2.0 0.5 - 2.0 mmol/L   CBC Auto Differential    Collection Time: 03/21/19  2:13 PM   Result Value Ref Range    WBC 12.19 (H) 3.50 - 10.80 10*3/mm3    RBC 5.26 4.20 - 5.76 10*6/mm3    Hemoglobin 15.9 13.1 - 17.5 g/dL    Hematocrit 48.0 38.9 - 50.9 %    MCV 91.3 80.0 - 99.0 fL    MCH 30.2 27.0 - 31.0 pg    MCHC 33.1 32.0 - 36.0 g/dL    RDW 13.8 11.3 - 14.5 %    RDW-SD 45.8 37.0 - 54.0 fl    MPV 11.1 6.0 - 12.0 fL    Platelets 206 150 - 450  10*3/mm3    Neutrophil % 90.4 (H) 41.0 - 71.0 %    Lymphocyte % 3.9 (L) 24.0 - 44.0 %    Monocyte % 5.4 0.0 - 12.0 %    Eosinophil % 0.2 0.0 - 3.0 %    Basophil % 0.1 0.0 - 1.0 %    Immature Grans % 0.2 0.0 - 0.6 %    Neutrophils, Absolute 11.03 (H) 1.50 - 8.30 10*3/mm3    Lymphocytes, Absolute 0.47 (L) 0.60 - 4.80 10*3/mm3    Monocytes, Absolute 0.66 0.00 - 1.00 10*3/mm3    Eosinophils, Absolute 0.02 0.00 - 0.30 10*3/mm3    Basophils, Absolute 0.01 0.00 - 0.20 10*3/mm3    Immature Grans, Absolute 0.03 0.00 - 0.05 10*3/mm3   Duplex Venous Upper Extremity - Right CAR    Collection Time: 03/21/19  3:04 PM   Result Value Ref Range    Right Internal Jugular Augment Y     Right Internal Jugular Compress C     Right Internal Jugular Phasic Y     Right Internal Jugular Spont Y     Left Internal Jugular Augment Y     Left Internal Jugular Compress C     Left Internal Jugular Phasic Y     Left Internal Jugular Spont Y     Right Subclavian Augment Y     Right Subclavian Compress C     Right Subclavian Phasic Y     Right Subclavian Spont Y     Right Axillary Augment Y     Right Axillary Compress C     Right Axillary Phasic Y     Right Axillary Spont Y     Right Brachial Augment Y     Right Brachial Compress C     Right Radial Compress C     Right Ulnar Compress C     Right Basilic Upper Compress C     Right Basilic Forearm Compress C     Right Cephalic Upper Compress C     Right Cephalic Forearm Compress C    Protein, CSF - Cerebrospinal Fluid, Lumbar Puncture    Collection Time: 03/21/19  6:01 PM   Result Value Ref Range    Protein, Total (CSF) 46.0 (H) 15.0 - 45.0 mg/dL   Glucose, CSF - Cerebrospinal Fluid, Lumbar Puncture    Collection Time: 03/21/19  6:01 PM   Result Value Ref Range    Glucose, CSF 70 40 - 70 mg/dL   Culture, CSF - Cerebrospinal Fluid, Lumbar Puncture    Collection Time: 03/21/19  6:01 PM   Result Value Ref Range    Gram Stain No WBCs or organisms seen    Cell Count, CSF - Cerebrospinal Fluid, Lumbar  Puncture    Collection Time: 03/21/19  6:01 PM   Result Value Ref Range    WBC, CSF 1 0 - 5 /mm3    RBC, CSF 3 0 - 5 /mm3    Color, CSF Colorless Colorless    Supernatant Color, CSF Colorless Colorless    Appearance, CSF Clear Clear    Volume, CSF 11.3 mL    Tube Number, CSF 4      Note: In addition to lab results from this visit, the labs listed above may include labs taken at another facility or during a different encounter within the last 24 hours. Please correlate lab times with ED admission and discharge times for further clarification of the services performed during this visit.    IR Lumbar Puncture Diag/Thera   Preliminary Result   Lumbar puncture at L4-5 and fluoroscopy, with return of 11   cc total of clear CSF. No complications.       DICTATED:   3/21/2019   EDITED/ls :   3/21/2019                XR Chest 1 View   Final Result   No active disease.       D:  03/21/2019   E:  03/21/2019       This report was finalized on 3/21/2019 5:54 PM by Dr. Landry Carter MD.          CT Head Without Contrast   Final Result   Normal unenhanced CT scan of the head.       D:  03/21/2019   E:  03/21/2019               This report was finalized on 3/21/2019 5:54 PM by Dr. Landry Carter MD.          CT Cervical Spine Without Contrast   Final Result   Mild osteoarthritis. There are no acute findings.       D:  03/21/2019   E:  03/21/2019           This report was finalized on 3/21/2019 5:54 PM by Dr. Landry Carter MD.            Vitals:    03/21/19 1630 03/21/19 1715 03/21/19 1937 03/21/19 1937   BP: 139/88 120/66     BP Location:       Patient Position:       Pulse: 95  82    Resp:       Temp:  100.5 °F (38.1 °C)  100.2 °F (37.9 °C)   TempSrc:  Oral  Oral   SpO2: 95% 94% 93%    Weight:       Height:         Medications   acetaminophen (TYLENOL) tablet 1,000 mg (1,000 mg Oral Given 3/21/19 1408)   sodium chloride 0.9 % bolus 1,000 mL (0 mL Intravenous Stopped 3/21/19 1703)   lidocaine (XYLOCAINE) 1 % injection 10 mL (10 mL  Subcutaneous Given 3/21/19 1802)   ibuprofen (ADVIL,MOTRIN) tablet 800 mg (800 mg Oral Given 3/21/19 1949)     ECG/EMG Results (last 24 hours)     ** No results found for the last 24 hours. **        No orders to display                       MDM    Final diagnoses:   Acute viral syndrome   Fever and chills   Acute neck pain   History of depression   History of posttraumatic stress disorder (PTSD)   History of hypertension       Documentation assistance provided by zina White.  Information recorded by the [email or web address] was done at my direction and has been verified and validated by me.     Chaparro White  03/21/19 3438       Chaparro White  03/21/19 2315       Jessica Galvan PA-C  03/21/19 2016

## 2019-03-22 LAB
BH CV UPPER VENOUS LEFT INTERNAL JUGULAR AUGMENT: NORMAL
BH CV UPPER VENOUS LEFT INTERNAL JUGULAR COMPRESS: NORMAL
BH CV UPPER VENOUS LEFT INTERNAL JUGULAR PHASIC: NORMAL
BH CV UPPER VENOUS LEFT INTERNAL JUGULAR SPONT: NORMAL
BH CV UPPER VENOUS RIGHT AXILLARY AUGMENT: NORMAL
BH CV UPPER VENOUS RIGHT AXILLARY COMPRESS: NORMAL
BH CV UPPER VENOUS RIGHT AXILLARY PHASIC: NORMAL
BH CV UPPER VENOUS RIGHT AXILLARY SPONT: NORMAL
BH CV UPPER VENOUS RIGHT BASILIC FOREARM COMPRESS: NORMAL
BH CV UPPER VENOUS RIGHT BASILIC UPPER COMPRESS: NORMAL
BH CV UPPER VENOUS RIGHT BRACHIAL AUGMENT: NORMAL
BH CV UPPER VENOUS RIGHT BRACHIAL COMPRESS: NORMAL
BH CV UPPER VENOUS RIGHT CEPHALIC FOREARM COMPRESS: NORMAL
BH CV UPPER VENOUS RIGHT CEPHALIC UPPER COMPRESS: NORMAL
BH CV UPPER VENOUS RIGHT INTERNAL JUGULAR AUGMENT: NORMAL
BH CV UPPER VENOUS RIGHT INTERNAL JUGULAR COMPRESS: NORMAL
BH CV UPPER VENOUS RIGHT INTERNAL JUGULAR PHASIC: NORMAL
BH CV UPPER VENOUS RIGHT INTERNAL JUGULAR SPONT: NORMAL
BH CV UPPER VENOUS RIGHT RADIAL COMPRESS: NORMAL
BH CV UPPER VENOUS RIGHT SUBCLAVIAN AUGMENT: NORMAL
BH CV UPPER VENOUS RIGHT SUBCLAVIAN COMPRESS: NORMAL
BH CV UPPER VENOUS RIGHT SUBCLAVIAN PHASIC: NORMAL
BH CV UPPER VENOUS RIGHT SUBCLAVIAN SPONT: NORMAL
BH CV UPPER VENOUS RIGHT ULNAR COMPRESS: NORMAL

## 2019-03-22 NOTE — DISCHARGE INSTRUCTIONS
ER evaluation revealed normal CT of the brain and cervical spine without contrast.  Patient had an ultrasound of the right upper extremity that showed no evidence of DVT.  Influenza a and B screen were negative.  Chest x-ray and urinalysis were also negative, as well as normal labs.  Lumbar puncture showed no evidence of meningitis.  History and exam is consistent with acute viral syndrome with fever.  Recommend patient to increase fluids.  Recommend Tylenol/ibuprofen every 4-6 hours as needed for fever.  Recommend very close PCP follow-up for recheck in 24-48 hours.  Return if any worsening symptoms.

## 2019-03-26 LAB
BACTERIA SPEC AEROBE CULT: NORMAL
BACTERIA SPEC AEROBE CULT: NORMAL

## 2019-03-28 LAB
BACTERIA SPEC AEROBE CULT: NORMAL
GRAM STN SPEC: NORMAL